# Patient Record
Sex: MALE | Race: WHITE | NOT HISPANIC OR LATINO | Employment: FULL TIME | URBAN - METROPOLITAN AREA
[De-identification: names, ages, dates, MRNs, and addresses within clinical notes are randomized per-mention and may not be internally consistent; named-entity substitution may affect disease eponyms.]

---

## 2020-12-18 ENCOUNTER — IMMUNIZATION (OUTPATIENT)
Dept: LAB | Age: 35
End: 2020-12-18

## 2020-12-18 DIAGNOSIS — Z23 NEED FOR VACCINATION: Primary | ICD-10-CM

## 2020-12-18 PROCEDURE — 0001A COVID 19 PFIZER-BIONTECH: CPT

## 2020-12-18 PROCEDURE — 91300 COVID 19 PFIZER-BIONTECH: CPT

## 2021-01-08 ENCOUNTER — IMMUNIZATION (OUTPATIENT)
Dept: LAB | Age: 36
End: 2021-01-08

## 2021-01-08 DIAGNOSIS — Z23 NEED FOR VACCINATION: Primary | ICD-10-CM

## 2021-01-08 PROCEDURE — 91300 COVID 19 PFIZER-BIONTECH: CPT

## 2021-01-08 PROCEDURE — 0002A COVID 19 PFIZER-BIONTECH: CPT

## 2023-04-10 ENCOUNTER — APPOINTMENT (OUTPATIENT)
Dept: URBAN - METROPOLITAN AREA CLINIC 314 | Age: 38
Setting detail: DERMATOLOGY
End: 2023-04-10

## 2023-04-10 DIAGNOSIS — D22 MELANOCYTIC NEVI: ICD-10-CM

## 2023-04-10 DIAGNOSIS — B35.2 TINEA MANUUM: ICD-10-CM

## 2023-04-10 DIAGNOSIS — Z12.83 ENCOUNTER FOR SCREENING FOR MALIGNANT NEOPLASM OF SKIN: ICD-10-CM

## 2023-04-10 DIAGNOSIS — L81.4 OTHER MELANIN HYPERPIGMENTATION: ICD-10-CM

## 2023-04-10 PROBLEM — D22.71 MELANOCYTIC NEVI OF RIGHT LOWER LIMB, INCLUDING HIP: Status: ACTIVE | Noted: 2023-04-10

## 2023-04-10 PROBLEM — D22.5 MELANOCYTIC NEVI OF TRUNK: Status: ACTIVE | Noted: 2023-04-10

## 2023-04-10 PROCEDURE — OTHER OBSERVATION: OTHER

## 2023-04-10 PROCEDURE — OTHER DIAGNOSIS COMMENT: OTHER

## 2023-04-10 PROCEDURE — OTHER OBSERVATION AND MEASURE: OTHER

## 2023-04-10 PROCEDURE — OTHER EDUCATIONAL RESOURCES PROVIDED: OTHER

## 2023-04-10 PROCEDURE — 99203 OFFICE O/P NEW LOW 30 MIN: CPT

## 2023-04-10 PROCEDURE — OTHER MIPS QUALITY: OTHER

## 2023-04-10 PROCEDURE — OTHER TREATMENT REGIMEN: OTHER

## 2023-04-10 PROCEDURE — OTHER KOH PREP: OTHER

## 2023-04-10 PROCEDURE — OTHER COUNSELING: OTHER

## 2023-04-10 ASSESSMENT — LOCATION DETAILED DESCRIPTION DERM
LOCATION DETAILED: LEFT RADIAL DORSAL HAND
LOCATION DETAILED: RIGHT INFERIOR LATERAL LOWER BACK
LOCATION DETAILED: SUPERIOR THORACIC SPINE
LOCATION DETAILED: RIGHT PROXIMAL PRETIBIAL REGION

## 2023-04-10 ASSESSMENT — LOCATION ZONE DERM
LOCATION ZONE: TRUNK
LOCATION ZONE: HAND
LOCATION ZONE: LEG

## 2023-04-10 ASSESSMENT — LOCATION SIMPLE DESCRIPTION DERM
LOCATION SIMPLE: LEFT HAND
LOCATION SIMPLE: RIGHT LOWER BACK
LOCATION SIMPLE: UPPER BACK
LOCATION SIMPLE: RIGHT PRETIBIAL REGION

## 2023-04-10 NOTE — PROCEDURE: TREATMENT REGIMEN
Initiate Treatment: Lotrimin Ultra bid
Plan: LEI was a weak positive, so advised pt that I can't r/o possibility that fungal infection is a secondary infection.  If doesn't fully clear with topical antifungal, plan to start rx cortisone to tx hand eczema.
Otc Regimen: Gentle Cleansers & Moisturizers
Detail Level: Zone

## 2023-04-10 NOTE — HPI: SKIN LESION
How Severe Is Your Skin Lesion?: moderate
Is This A New Presentation, Or A Follow-Up?: Skin Lesion
Additional History: The patient states that the affected area is the first part of his hand that touches the water when he washes them. He noted that he did try treating with a cortisone and antihistamine cream and it went away. Then it came back and is getting redder and larger by the day.

## 2023-04-10 NOTE — PROCEDURE: KOH PREP
Cpt Desired: No Billing
Koh Procedure Text (Tissue Harvesting Technique): A 15-blade scalpel was used to scrape the skin. The skin scrapings were placed on a glass slide, covered with a coverslip and a KOH solution was applied.
Showing: branching hyphae
Detail Level: Detailed
Koh Intro Text (From The.....): A KOH prep was ordered and evaluated from the

## 2023-04-10 NOTE — PROCEDURE: DIAGNOSIS COMMENT
Render Risk Assessment In Note?: no
Comment: Suspect recently worsened due to possible allergy to neosporin.
Detail Level: Simple

## 2023-06-26 ENCOUNTER — HOSPITAL ENCOUNTER (OUTPATIENT)
Dept: CT IMAGING | Age: 38
Discharge: HOME | End: 2023-06-26
Attending: PHYSICIAN ASSISTANT

## 2023-06-26 DIAGNOSIS — H65.22 LEFT CHRONIC SEROUS OTITIS MEDIA: ICD-10-CM

## 2023-06-26 PROCEDURE — 70480 CT ORBIT/EAR/FOSSA W/O DYE: CPT

## 2023-11-28 ENCOUNTER — HOSPITAL ENCOUNTER (OUTPATIENT)
Dept: GENERAL RADIOLOGY | Age: 38
Discharge: HOME | End: 2023-11-28

## 2023-11-28 DIAGNOSIS — M79.645 FINGER PAIN, LEFT: ICD-10-CM

## 2023-11-28 PROCEDURE — 73140 X-RAY EXAM OF FINGER(S): CPT
